# Patient Record
Sex: MALE | Race: WHITE | NOT HISPANIC OR LATINO
[De-identification: names, ages, dates, MRNs, and addresses within clinical notes are randomized per-mention and may not be internally consistent; named-entity substitution may affect disease eponyms.]

---
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
## 2018-05-10 ENCOUNTER — APPOINTMENT (OUTPATIENT)
Dept: PEDIATRIC GASTROENTEROLOGY | Facility: CLINIC | Age: 18
End: 2018-05-10
Payer: COMMERCIAL

## 2018-05-10 VITALS
HEART RATE: 55 BPM | SYSTOLIC BLOOD PRESSURE: 98 MMHG | DIASTOLIC BLOOD PRESSURE: 62 MMHG | HEIGHT: 70.51 IN | BODY MASS INDEX: 24.16 KG/M2 | WEIGHT: 170.64 LBS

## 2018-05-10 DIAGNOSIS — K62.5 HEMORRHAGE OF ANUS AND RECTUM: ICD-10-CM

## 2018-05-10 LAB
BASOPHILS # BLD AUTO: 0 K/UL
BASOPHILS NFR BLD AUTO: 0 %
EOSINOPHIL # BLD AUTO: 0.13 K/UL
EOSINOPHIL NFR BLD AUTO: 2.9 %
HCT VFR BLD CALC: 44.6 %
HGB BLD-MCNC: 14.9 G/DL
IMM GRANULOCYTES NFR BLD AUTO: 0.2 %
LYMPHOCYTES # BLD AUTO: 0.97 K/UL
LYMPHOCYTES NFR BLD AUTO: 21.8 %
MAN DIFF?: NORMAL
MCHC RBC-ENTMCNC: 30.6 PG
MCHC RBC-ENTMCNC: 33.4 GM/DL
MCV RBC AUTO: 91.6 FL
MONOCYTES # BLD AUTO: 0.37 K/UL
MONOCYTES NFR BLD AUTO: 8.3 %
NEUTROPHILS # BLD AUTO: 2.97 K/UL
NEUTROPHILS NFR BLD AUTO: 66.8 %
PLATELET # BLD AUTO: 131 K/UL
RBC # BLD: 4.87 M/UL
RBC # FLD: 13 %
WBC # FLD AUTO: 4.45 K/UL

## 2018-05-10 PROCEDURE — 99214 OFFICE O/P EST MOD 30 MIN: CPT

## 2018-05-11 LAB
ALBUMIN SERPL ELPH-MCNC: 5.1 G/DL
ALP BLD-CCNC: 86 U/L
ALT SERPL-CCNC: 11 U/L
ANION GAP SERPL CALC-SCNC: 13 MMOL/L
AST SERPL-CCNC: 19 U/L
BILIRUB SERPL-MCNC: 0.8 MG/DL
BUN SERPL-MCNC: 13 MG/DL
CALCIUM SERPL-MCNC: 9.8 MG/DL
CHLORIDE SERPL-SCNC: 105 MMOL/L
CO2 SERPL-SCNC: 25 MMOL/L
CREAT SERPL-MCNC: 1.08 MG/DL
CRP SERPL-MCNC: <0.2 MG/DL
GLUCOSE SERPL-MCNC: 78 MG/DL
POTASSIUM SERPL-SCNC: 4.9 MMOL/L
PROT SERPL-MCNC: 7.6 G/DL
SODIUM SERPL-SCNC: 143 MMOL/L

## 2018-05-15 LAB
ADALIMUMAB AB SERPL-MCNC: <25 NG/ML
ADALIMUMAB SERPL-MCNC: 25 UG/ML

## 2018-05-21 LAB — CALPROTECTIN FECAL: <16 UG/G

## 2018-07-24 PROBLEM — Z78.9 ALCOHOL USE: Status: ACTIVE | Noted: 2017-07-23

## 2018-12-07 ENCOUNTER — APPOINTMENT (OUTPATIENT)
Dept: PEDIATRIC GASTROENTEROLOGY | Facility: CLINIC | Age: 18
End: 2018-12-07
Payer: COMMERCIAL

## 2018-12-07 VITALS
BODY MASS INDEX: 24.29 KG/M2 | HEART RATE: 62 BPM | HEIGHT: 70.87 IN | DIASTOLIC BLOOD PRESSURE: 67 MMHG | WEIGHT: 173.5 LBS | SYSTOLIC BLOOD PRESSURE: 107 MMHG

## 2018-12-07 LAB
BASOPHILS # BLD AUTO: 0.02 K/UL
BASOPHILS NFR BLD AUTO: 0.3 %
EOSINOPHIL # BLD AUTO: 0.18 K/UL
EOSINOPHIL NFR BLD AUTO: 2.6 %
ERYTHROCYTE [SEDIMENTATION RATE] IN BLOOD BY WESTERGREN METHOD: 10 MM/HR
HCT VFR BLD CALC: 46.8 %
HGB BLD-MCNC: 15.2 G/DL
IMM GRANULOCYTES NFR BLD AUTO: 0.3 %
LYMPHOCYTES # BLD AUTO: 2.03 K/UL
LYMPHOCYTES NFR BLD AUTO: 29.6 %
MAN DIFF?: NORMAL
MCHC RBC-ENTMCNC: 30.3 PG
MCHC RBC-ENTMCNC: 32.5 GM/DL
MCV RBC AUTO: 93.4 FL
MONOCYTES # BLD AUTO: 0.6 K/UL
MONOCYTES NFR BLD AUTO: 8.8 %
NEUTROPHILS # BLD AUTO: 4 K/UL
NEUTROPHILS NFR BLD AUTO: 58.4 %
PLATELET # BLD AUTO: 153 K/UL
RBC # BLD: 5.01 M/UL
RBC # FLD: 13.5 %
WBC # FLD AUTO: 6.85 K/UL

## 2018-12-07 PROCEDURE — 99214 OFFICE O/P EST MOD 30 MIN: CPT

## 2018-12-10 LAB
ALBUMIN SERPL ELPH-MCNC: 4.8 G/DL
ALP BLD-CCNC: 72 U/L
ALT SERPL-CCNC: 14 U/L
ANION GAP SERPL CALC-SCNC: 12 MMOL/L
AST SERPL-CCNC: 22 U/L
BILIRUB SERPL-MCNC: 0.6 MG/DL
BUN SERPL-MCNC: 15 MG/DL
CALCIUM SERPL-MCNC: 10 MG/DL
CHLORIDE SERPL-SCNC: 103 MMOL/L
CO2 SERPL-SCNC: 24 MMOL/L
CREAT SERPL-MCNC: 0.92 MG/DL
CRP SERPL-MCNC: <0.1 MG/DL
GGT SERPL-CCNC: 15 U/L
GLUCOSE SERPL-MCNC: 84 MG/DL
POTASSIUM SERPL-SCNC: 4.6 MMOL/L
PROT SERPL-MCNC: 8.1 G/DL
SODIUM SERPL-SCNC: 139 MMOL/L

## 2018-12-11 NOTE — HISTORY OF PRESENT ILLNESS
[de-identified] : Brief Overview: Caleb presented in December 2014 with intermittent abdominal pain, loose stool, fever, and fecal urgency over a few months period, and a few days of rectal bleeding. He was found to have mild anemia, hypoalbuminemia, and elevated CRP.  Fecal calprotectin was 253 (nl < 120). He underwent endoscopy and colonoscopy with findings of aphthous ulcers in stomach, duodenum and colon including an ulcerated ileocecal valve. He also had a perianal fissure. Biopsies identified chronic gastritis and granuloma in the colon.  An MR Enterography showed diffuse thickening of the small bowel with diffuse enhancement in jejunum > ileum and mild hyperenhancement of the duodenum.  Between January - March 2015, Caleb completed 8 weeks of enteral nutritional therapy induction of remission regimen with 90% of calories from ensure and 10% from restricted list of foods. He then went on to continue a maintenance regimen of 50% calories from formula and 50% from a restricted list of foods known as Crohn disease elimination diet. This led to clinical remission and resolution of his anemia, hypoalbuminemia.  Fecal calprotectin at completion of the 8 week induction of remission trended down from 253 to 179. A video capsule endoscopy in 9/2015 demonstrated mild to moderate active disease with erythema and superficial aphthae throughout the small bowel, most prominent in the jejunum / mid small bowel. Following this study, initiation of immunomodulator or biologic was discussed, however Caleb and his parents preferred non-immune suppressive therapy, so he was started on metronidazole (rifaximin was denied by insurance) and continued dietary therapy. Ultimately, he abandoned the dietary therapy, and while on no therapy he continued to feel well.  A video capsule endoscopy was performed in May 2016, which demonstrated moderate jejunal disease with edema, erythema and possible luminal narrowing. He had scattered superficial aphthae in the mid and distal small bowel.  A fecal calprotectin in September 2016 was normal (<16). Caleb started Humira 9/15/16. Repeat MR Enterography in May 2017 with resolution of previous abnormal jejunal bowel loops. On regular blood tests, he was found to have mild thrombocytopenia. He was evaluated by Dr. 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 Caleb does not have any bleeding or bruising symptoms. \par \par Interval history: Caleb has been well since last visit. No symptoms of concern. He has regular adherence to his Humira, has been very good about taking every 14 days.  He had a series of injections with local red painful reaction at injection site, but this has resolved.  Last few injections no problems.  He has no abdominal pain, regular formed bowel movement without rectal bleeding.

## 2018-12-11 NOTE — ASSESSMENT
[Educated Patient & Family about Diagnosis] : educated the patient and family about the diagnosis [FreeTextEntry1] : In summary, Caleb is a 18 year old male with Crohn Disease predominantly of small bowel jejunum > ileum with clinical, biochemical, and radiographic (MR) remission of disease.  Discussed ultimately repeating a video capsule and colonoscopy to evaluate the mucosa.  Also discussed transition to adult GI in the next 12-18 months once he has plans for post-college graduation in place.  \par \par Recommended plan\par - labs today\par - continue Humira 40 mg every 14 days\par

## 2018-12-11 NOTE — PHYSICAL EXAM
[Well Nourished] : well nourished [NAD] : in no acute distress [icteric] : anicteric [Moist & Pink Mucous Membranes] : moist and pink mucous membranes [CTAB] : lungs clear to auscultation bilaterally [Respiratory Distress] : no respiratory distress  [Regular Rate and Rhythm] : regular rate and rhythm [Normal S1, S2] : normal S1 and S2 [Soft] : soft  [Distended] : non distended [Tender] : non tender [Normal Bowel Sounds] : normal bowel sounds [No HSM] : no hepatosplenomegaly appreciated [Normal Tone] : normal tone [Well-Perfused] : well-perfused [Edema] : no edema [Cyanosis] : no cyanosis [Rash] : no rash [Jaundice] : no jaundice [Interactive] : interactive

## 2018-12-11 NOTE — CONSULT LETTER
[Dear  ___] : Dear  [unfilled], [Courtesy Letter:] : I had the pleasure of seeing your patient, [unfilled], in my office today. [Please see my note below.] : Please see my note below. [Consult Closing:] : Thank you very much for allowing me to participate in the care of this patient.  If you have any questions, please do not hesitate to contact me. [Sincerely,] : Sincerely, [FreeTextEntry3] : Dev Bernal MD MS\par [facility]\par

## 2018-12-11 NOTE — REASON FOR VISIT
[Consultation Follow Up] : a consultation follow up  [Patient] : patient [Mother] : mother [FreeTextEntry2] : Crohn Disease

## 2019-04-30 ENCOUNTER — APPOINTMENT (OUTPATIENT)
Dept: PULMONOLOGY | Facility: CLINIC | Age: 19
End: 2019-04-30
Payer: COMMERCIAL

## 2019-04-30 VITALS
HEIGHT: 71 IN | DIASTOLIC BLOOD PRESSURE: 80 MMHG | SYSTOLIC BLOOD PRESSURE: 120 MMHG | HEART RATE: 66 BPM | OXYGEN SATURATION: 99 %

## 2019-04-30 DIAGNOSIS — J45.909 UNSPECIFIED ASTHMA, UNCOMPLICATED: ICD-10-CM

## 2019-04-30 DIAGNOSIS — R06.00 DYSPNEA, UNSPECIFIED: ICD-10-CM

## 2019-04-30 PROCEDURE — 94729 DIFFUSING CAPACITY: CPT

## 2019-04-30 PROCEDURE — 94727 GAS DIL/WSHOT DETER LNG VOL: CPT

## 2019-04-30 PROCEDURE — 99242 OFF/OP CONSLTJ NEW/EST SF 20: CPT | Mod: 25

## 2019-04-30 PROCEDURE — 94060 EVALUATION OF WHEEZING: CPT

## 2019-04-30 RX ORDER — ALBUTEROL SULFATE 90 UG/1
108 (90 BASE) AEROSOL, METERED RESPIRATORY (INHALATION)
Qty: 1 | Refills: 1 | Status: ACTIVE | COMMUNITY
Start: 2019-04-30 | End: 1900-01-01

## 2019-04-30 NOTE — PHYSICAL EXAM
[General Appearance - Well Developed] : well developed [Normal Appearance] : normal appearance [Well Groomed] : well groomed [General Appearance - Well Nourished] : well nourished [No Deformities] : no deformities [General Appearance - In No Acute Distress] : no acute distress [Neck Appearance] : the appearance of the neck was normal [Neck Cervical Mass (___cm)] : no neck mass was observed [Jugular Venous Distention Increased] : there was no jugular-venous distention [Thyroid Diffuse Enlargement] : the thyroid was not enlarged [Thyroid Nodule] : there were no palpable thyroid nodules [Heart Rate And Rhythm] : heart rate and rhythm were normal [Heart Sounds] : normal S1 and S2 [Murmurs] : no murmurs present [Respiration, Rhythm And Depth] : normal respiratory rhythm and effort [Exaggerated Use Of Accessory Muscles For Inspiration] : no accessory muscle use [Auscultation Breath Sounds / Voice Sounds] : lungs were clear to auscultation bilaterally [Abdomen Soft] : soft [Abdomen Tenderness] : non-tender [Abdomen Mass (___ Cm)] : no abdominal mass palpated [Nail Clubbing] : no clubbing of the fingernails [Cyanosis, Localized] : no localized cyanosis [Petechial Hemorrhages (___cm)] : no petechial hemorrhages [Skin Color & Pigmentation] : normal skin color and pigmentation [Skin Turgor] : normal skin turgor [] : no rash [No Focal Deficits] : no focal deficits [Oriented To Time, Place, And Person] : oriented to person, place, and time [Impaired Insight] : insight and judgment were intact [Affect] : the affect was normal

## 2019-05-05 PROBLEM — R06.00 DYSPNEA: Status: ACTIVE | Noted: 2019-05-05

## 2019-05-05 NOTE — REVIEW OF SYSTEMS
[As Noted in HPI] : as noted in HPI [Sputum] : sputum  [Dyspnea] : dyspnea [Negative] : Sleep Disorder [Cough] : no cough [Wheezing] : no wheezing

## 2019-05-05 NOTE — DISCUSSION/SUMMARY
[FreeTextEntry1] : 17 yo male with mild OAD. Albuterol MDI PRN recommended for now. Treatment adjustment will depend on symptomatic needs.

## 2019-05-05 NOTE — HISTORY OF PRESENT ILLNESS
[FreeTextEntry1] : 19 yo male presents for evaluation of SOB associated with PRN sputum production for a few months. The patient also complains of PRN chest discomfort without fever or hemoptysis. He was treated with nebulized meds as a child, last about 15 years ago. He exercises without difficulty.

## 2019-05-22 ENCOUNTER — APPOINTMENT (OUTPATIENT)
Dept: PEDIATRIC GASTROENTEROLOGY | Facility: CLINIC | Age: 19
End: 2019-05-22
Payer: COMMERCIAL

## 2019-05-22 VITALS
SYSTOLIC BLOOD PRESSURE: 138 MMHG | BODY MASS INDEX: 26.22 KG/M2 | HEIGHT: 70.47 IN | HEART RATE: 67 BPM | WEIGHT: 185.19 LBS | DIASTOLIC BLOOD PRESSURE: 74 MMHG

## 2019-05-22 PROCEDURE — 99214 OFFICE O/P EST MOD 30 MIN: CPT

## 2019-05-23 ENCOUNTER — MEDICATION RENEWAL (OUTPATIENT)
Age: 19
End: 2019-05-23

## 2019-05-27 NOTE — PHYSICAL EXAM
[Well Nourished] : well nourished [NAD] : in no acute distress [icteric] : anicteric [Moist & Pink Mucous Membranes] : moist and pink mucous membranes [CTAB] : lungs clear to auscultation bilaterally [Respiratory Distress] : no respiratory distress  [Regular Rate and Rhythm] : regular rate and rhythm [Normal S1, S2] : normal S1 and S2 [Soft] : soft  [Distended] : non distended [Tender] : non tender [Normal Bowel Sounds] : normal bowel sounds [Normal Tone] : normal tone [No HSM] : no hepatosplenomegaly appreciated [Well-Perfused] : well-perfused [Edema] : no edema [Cyanosis] : no cyanosis [Rash] : no rash [Jaundice] : no jaundice [Interactive] : interactive

## 2019-05-27 NOTE — ASSESSMENT
[Educated Patient & Family about Diagnosis] : educated the patient and family about the diagnosis [FreeTextEntry1] : In summary, Caleb is a 18 year old male with Crohn Disease in remission on Humira with therapeutic ADA levels and no symptoms.  Calprotectin normal May 2018, last small bowel imaging by MRI 2017.  \par \par Recommended plan\par - continue Humira at current dose\par - This summer video capsule to evaluate for mucosal healing\par - labs at time of VCE

## 2019-05-27 NOTE — HISTORY OF PRESENT ILLNESS
[de-identified] : Brief Overview: Caleb presented in December 2014 with intermittent abdominal pain, loose stool, fever, and fecal urgency over a few months period, and a few days of rectal bleeding. He was found to have mild anemia, hypoalbuminemia, and elevated CRP. Fecal calprotectin was 253 (nl < 120). He underwent endoscopy and colonoscopy with findings of aphthous ulcers in stomach, duodenum and colon including an ulcerated ileocecal valve. He also had a perianal fissure. Biopsies identified chronic gastritis and granuloma in the colon. An MR Enterography showed diffuse thickening of the small bowel with diffuse enhancement in jejunum > ileum and mild hyperenhancement of the duodenum. Between January - March 2015, Caleb completed 8 weeks of enteral nutritional therapy induction of remission regimen with 90% of calories from ensure and 10% from restricted list of foods. He then went on to continue a maintenance regimen of 50% calories from formula and 50% from a restricted list of foods known as Crohn disease elimination diet. This led to clinical remission and resolution of his anemia, hypoalbuminemia. Fecal calprotectin at completion of the 8 week induction of remission trended down from 253 to 179. A video capsule endoscopy in 9/2015 demonstrated mild to moderate active disease with erythema and superficial aphthae throughout the small bowel, most prominent in the jejunum / mid small bowel. Following this study, initiation of immunomodulator or biologic was discussed, however Caleb and his parents preferred non-immune suppressive therapy, so he was started on metronidazole (rifaximin was denied by insurance) and continued dietary therapy. Ultimately, he abandoned the dietary therapy, and while on no therapy he continued to feel well. A video capsule endoscopy was performed in May 2016, which demonstrated moderate jejunal disease with edema, erythema and possible luminal narrowing. He had scattered superficial aphthae in the mid and distal small bowel. A fecal calprotectin in September 2016 was normal (<16). Caleb started Humira 9/15/16. Repeat MR Enterography in May 2017 with resolution of previous abnormal jejunal bowel loops. On regular blood tests, he was found to have mild thrombocytopenia. He was evaluated by Dr. 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 Caleb does not have any bleeding or bruising symptoms. \par \par Interval history: Caleb has been well since last visit. No symptoms of concern. He has regular adherence to his Humira, has been very good about taking every 14 days. In the past, he had a series of injections with local red painful reaction at injection site, but this has resolved. Last few injections no problems. He has no abdominal pain, regular formed bowel movement without rectal bleeding.  He recently saw a pulmonologist for cough / sputum production, diagnosed with mild obstructive airway disease and prescribed a bronchodilator prn.

## 2019-05-27 NOTE — CONSULT LETTER
[Dear  ___] : Dear  [unfilled], [Please see my note below.] : Please see my note below. [Courtesy Letter:] : I had the pleasure of seeing your patient, [unfilled], in my office today. [Consult Closing:] : Thank you very much for allowing me to participate in the care of this patient.  If you have any questions, please do not hesitate to contact me. [Sincerely,] : Sincerely, [FreeTextEntry3] : Dev Bernal MD MS\par [facility]\par

## 2019-07-09 ENCOUNTER — OUTPATIENT (OUTPATIENT)
Dept: OUTPATIENT SERVICES | Age: 19
LOS: 1 days | End: 2019-07-09
Payer: COMMERCIAL

## 2019-07-09 DIAGNOSIS — K50.90 CROHN'S DISEASE, UNSPECIFIED, WITHOUT COMPLICATIONS: ICD-10-CM

## 2019-07-09 PROCEDURE — 91110 GI TRC IMG INTRAL ESOPH-ILE: CPT | Mod: 26

## 2019-07-23 ENCOUNTER — APPOINTMENT (OUTPATIENT)
Dept: PULMONOLOGY | Facility: CLINIC | Age: 19
End: 2019-07-23

## 2019-08-14 ENCOUNTER — LABORATORY RESULT (OUTPATIENT)
Age: 19
End: 2019-08-14

## 2019-08-14 LAB
25(OH)D3 SERPL-MCNC: 47.4 NG/ML
ALBUMIN SERPL ELPH-MCNC: 4.4 G/DL
ALP BLD-CCNC: 95 U/L
ALT SERPL-CCNC: 43 U/L
ANION GAP SERPL CALC-SCNC: 12 MMOL/L
AST SERPL-CCNC: 61 U/L
BILIRUB SERPL-MCNC: 0.6 MG/DL
BUN SERPL-MCNC: 16 MG/DL
CALCIUM SERPL-MCNC: 9.2 MG/DL
CHLORIDE SERPL-SCNC: 100 MMOL/L
CO2 SERPL-SCNC: 26 MMOL/L
CREAT SERPL-MCNC: 0.97 MG/DL
CRP SERPL-MCNC: 0.21 MG/DL
GLUCOSE SERPL-MCNC: 76 MG/DL
HBV SURFACE AG SER QL: NONREACTIVE
POTASSIUM SERPL-SCNC: 4.3 MMOL/L
PROT SERPL-MCNC: 7.4 G/DL
SODIUM SERPL-SCNC: 138 MMOL/L

## 2019-08-16 ENCOUNTER — TRANSCRIPTION ENCOUNTER (OUTPATIENT)
Age: 19
End: 2019-08-16

## 2019-08-16 LAB
BASOPHILS # BLD AUTO: 0 K/UL
BASOPHILS NFR BLD AUTO: 0 %
EOSINOPHIL # BLD AUTO: 0.1 K/UL
EOSINOPHIL NFR BLD AUTO: 0.9 %
HCT VFR BLD CALC: 48 %
HGB BLD-MCNC: 14.4 G/DL
LYMPHOCYTES # BLD AUTO: 3.32 K/UL
LYMPHOCYTES NFR BLD AUTO: 31 %
MAN DIFF?: YES
MCHC RBC-ENTMCNC: 29.9 PG
MCHC RBC-ENTMCNC: 30 GM/DL
MCV RBC AUTO: 99.6 FL
MONOCYTES # BLD AUTO: 0.76 K/UL
MONOCYTES NFR BLD AUTO: 7.1 %
NEUTROPHILS # BLD AUTO: 3.03 K/UL
NEUTROPHILS NFR BLD AUTO: 28.3 %
PLATELET # BLD AUTO: 107 K/UL
RBC # BLD: 4.82 M/UL
RBC # FLD: 13.8 %
WBC # FLD AUTO: 10.72 K/UL

## 2019-08-17 ENCOUNTER — CLINICAL ADVICE (OUTPATIENT)
Age: 19
End: 2019-08-17

## 2019-08-18 LAB
M TB IFN-G BLD-IMP: NEGATIVE
QUANTIFERON TB PLUS MITOGEN MINUS NIL: 9.06 IU/ML
QUANTIFERON TB PLUS NIL: 0.07 IU/ML
QUANTIFERON TB PLUS TB1 MINUS NIL: -0.01 IU/ML
QUANTIFERON TB PLUS TB2 MINUS NIL: -0.01 IU/ML

## 2019-08-21 LAB
EBV EA AB SER IA-ACNC: 77.1 U/ML
EBV EA AB TITR SER IF: POSITIVE
EBV EA IGG SER QL IA: 32.3 U/ML
EBV EA IGG SER-ACNC: POSITIVE
EBV EA IGM SER IA-ACNC: POSITIVE
EBV PATRN SPEC IB-IMP: NORMAL
EBV VCA IGG SER IA-ACNC: 78.7 U/ML
EBV VCA IGM SER QL IA: >160 U/ML
EPSTEIN-BARR VIRUS CAPSID ANTIGEN IGG: POSITIVE

## 2019-09-12 ENCOUNTER — MEDICATION RENEWAL (OUTPATIENT)
Age: 19
End: 2019-09-12

## 2019-09-18 LAB
ALBUMIN SERPL ELPH-MCNC: 4.7 G/DL
ALP BLD-CCNC: 90 U/L
ALT SERPL-CCNC: 18 U/L
AST SERPL-CCNC: 23 U/L
BILIRUB DIRECT SERPL-MCNC: 0.2 MG/DL
BILIRUB INDIRECT SERPL-MCNC: 0.5 MG/DL
BILIRUB SERPL-MCNC: 0.7 MG/DL
PROT SERPL-MCNC: 7.5 G/DL

## 2019-09-19 LAB
BASOPHILS # BLD AUTO: 0.04 K/UL
BASOPHILS NFR BLD AUTO: 0.6 %
EBV EA AB SER IA-ACNC: 48.1 U/ML
EBV EA AB TITR SER IF: NEGATIVE
EBV EA IGG SER QL IA: <3 U/ML
EBV EA IGG SER-ACNC: POSITIVE
EBV EA IGM SER IA-ACNC: POSITIVE
EBV PATRN SPEC IB-IMP: NORMAL
EBV VCA IGG SER IA-ACNC: 49.5 U/ML
EBV VCA IGM SER QL IA: >160 U/ML
EOSINOPHIL # BLD AUTO: 0.34 K/UL
EOSINOPHIL NFR BLD AUTO: 4.9 %
EPSTEIN-BARR VIRUS CAPSID ANTIGEN IGG: POSITIVE
HCT VFR BLD CALC: 46.2 %
HGB BLD-MCNC: 14.8 G/DL
IMM GRANULOCYTES NFR BLD AUTO: 0.1 %
LYMPHOCYTES # BLD AUTO: 2.56 K/UL
LYMPHOCYTES NFR BLD AUTO: 37.1 %
MAN DIFF?: NORMAL
MCHC RBC-ENTMCNC: 30.3 PG
MCHC RBC-ENTMCNC: 32 GM/DL
MCV RBC AUTO: 94.7 FL
MONOCYTES # BLD AUTO: 0.61 K/UL
MONOCYTES NFR BLD AUTO: 8.8 %
NEUTROPHILS # BLD AUTO: 3.34 K/UL
NEUTROPHILS NFR BLD AUTO: 48.5 %
PLATELET # BLD AUTO: 128 K/UL
RBC # BLD: 4.88 M/UL
RBC # FLD: 12.6 %
WBC # FLD AUTO: 6.9 K/UL

## 2019-09-23 LAB
EBV DNA SERPL NAA+PROBE-ACNC: NOT DETECTED IU/ML
EBVPCR LOG: NOT DETECTED LOGIU/ML

## 2019-10-11 ENCOUNTER — APPOINTMENT (OUTPATIENT)
Dept: PEDIATRIC GASTROENTEROLOGY | Facility: CLINIC | Age: 19
End: 2019-10-11
Payer: COMMERCIAL

## 2019-10-11 VITALS
HEART RATE: 59 BPM | BODY MASS INDEX: 25.43 KG/M2 | HEIGHT: 70.67 IN | SYSTOLIC BLOOD PRESSURE: 126 MMHG | WEIGHT: 181.66 LBS | DIASTOLIC BLOOD PRESSURE: 71 MMHG

## 2019-10-11 DIAGNOSIS — B27.90 INFECTIOUS MONONUCLEOSIS, UNSPECIFIED W/OUT COMPLICATION: ICD-10-CM

## 2019-10-11 PROCEDURE — 99214 OFFICE O/P EST MOD 30 MIN: CPT

## 2019-10-11 NOTE — ASSESSMENT
[Educated Patient & Family about Diagnosis] : educated the patient and family about the diagnosis [FreeTextEntry1] : Caleb is a 19 year old with Crohn Disease in remission on adalimumab who developed primary EBV infection 6+ weeks ago and has been off anti-TNF therapy since.  He appears to have full clinical resolution.  He has no hepatosplenomegaly.  Discussed with Fabriziomayra rare cases of malignancy including HSTCL secondary to EBV infection in immunosuppressed patients.  Will recheck liver enzymes and EBV serology today.  He can restart adalimumab.  Recommended a loading dose of 80 mg, then back to regular 40 mg dosing in 2 weeks and every other week thereafter.

## 2019-10-11 NOTE — HISTORY OF PRESENT ILLNESS
[de-identified] : Since last visit, Caleb developed primary EBV infection in August with predominant symptom of cervical lymphadenopathy.  Infection was confirmed by EBV serology.  He immediately stopped his adalimumab injections.  He has not had much fatigue, muscle aches or any other systemic complaint.  He has had repeat serology identifying EBV EA and not yet development of EBNA.  He had a EBV DNA PCR without any circulating virus.  He now feels well with resolution of lymphadenopathy.

## 2019-10-11 NOTE — CONSULT LETTER
[Dear  ___] : Dear  [unfilled], [Courtesy Letter:] : I had the pleasure of seeing your patient, [unfilled], in my office today. [Please see my note below.] : Please see my note below. [Referral Closing:] : Thank you very much for seeing this patient.  If you have any questions, please do not hesitate to contact me. [Sincerely,] : Sincerely, [FreeTextEntry3] : Dev Bernal MD MS\par [facility]\par

## 2019-10-11 NOTE — PHYSICAL EXAM
[Well Nourished] : well nourished [NAD] : in no acute distress [icteric] : anicteric [Moist & Pink Mucous Membranes] : moist and pink mucous membranes [CTAB] : lungs clear to auscultation bilaterally [Respiratory Distress] : no respiratory distress  [Regular Rate and Rhythm] : regular rate and rhythm [Normal S1, S2] : normal S1 and S2 [Soft] : soft  [Distended] : non distended [Tender] : non tender [Normal Bowel Sounds] : normal bowel sounds [No HSM] : no hepatosplenomegaly appreciated [Normal Tone] : normal tone [Well-Perfused] : well-perfused [Edema] : no edema [Cyanosis] : no cyanosis [Rash] : no rash [Jaundice] : no jaundice [Interactive] : interactive [de-identified] : No cervical, supraclavicular or axillary lymphadenopathy

## 2019-10-16 LAB
ALBUMIN SERPL ELPH-MCNC: 4.5 G/DL
ALP BLD-CCNC: 85 U/L
ALT SERPL-CCNC: 18 U/L
AST SERPL-CCNC: 23 U/L
BASOPHILS # BLD AUTO: 0.03 K/UL
BASOPHILS NFR BLD AUTO: 0.5 %
BILIRUB DIRECT SERPL-MCNC: 0.2 MG/DL
BILIRUB INDIRECT SERPL-MCNC: 0.5 MG/DL
BILIRUB SERPL-MCNC: 0.7 MG/DL
EOSINOPHIL # BLD AUTO: 0.26 K/UL
EOSINOPHIL NFR BLD AUTO: 4.2 %
HCT VFR BLD CALC: 45.8 %
HGB BLD-MCNC: 14.8 G/DL
IMM GRANULOCYTES NFR BLD AUTO: 0.5 %
LYMPHOCYTES # BLD AUTO: 2.03 K/UL
LYMPHOCYTES NFR BLD AUTO: 32.7 %
MAN DIFF?: NORMAL
MCHC RBC-ENTMCNC: 29.2 PG
MCHC RBC-ENTMCNC: 32.3 GM/DL
MCV RBC AUTO: 90.3 FL
MONOCYTES # BLD AUTO: 0.54 K/UL
MONOCYTES NFR BLD AUTO: 8.7 %
NEUTROPHILS # BLD AUTO: 3.31 K/UL
NEUTROPHILS NFR BLD AUTO: 53.4 %
PLATELET # BLD AUTO: 140 K/UL
PROT SERPL-MCNC: 7 G/DL
RBC # BLD: 5.07 M/UL
RBC # FLD: 12.7 %
WBC # FLD AUTO: 6.2 K/UL

## 2019-10-18 LAB
EBV EA AB SER IA-ACNC: 35.8 U/ML
EBV EA AB TITR SER IF: NEGATIVE
EBV EA IGG SER QL IA: <3 U/ML
EBV EA IGG SER-ACNC: POSITIVE
EBV EA IGM SER IA-ACNC: POSITIVE
EBV PATRN SPEC IB-IMP: NORMAL
EBV VCA IGG SER IA-ACNC: 57.8 U/ML
EBV VCA IGM SER QL IA: >160 U/ML
EPSTEIN-BARR VIRUS CAPSID ANTIGEN IGG: POSITIVE

## 2020-04-03 ENCOUNTER — RX RENEWAL (OUTPATIENT)
Age: 20
End: 2020-04-03

## 2020-09-10 ENCOUNTER — APPOINTMENT (OUTPATIENT)
Dept: PEDIATRIC GASTROENTEROLOGY | Facility: CLINIC | Age: 20
End: 2020-09-10

## 2020-11-12 ENCOUNTER — RX RENEWAL (OUTPATIENT)
Age: 20
End: 2020-11-12

## 2020-11-17 ENCOUNTER — APPOINTMENT (OUTPATIENT)
Dept: PEDIATRIC GASTROENTEROLOGY | Facility: CLINIC | Age: 20
End: 2020-11-17
Payer: COMMERCIAL

## 2020-11-17 VITALS
TEMPERATURE: 98.4 F | WEIGHT: 186.51 LBS | HEART RATE: 55 BPM | BODY MASS INDEX: 26.11 KG/M2 | DIASTOLIC BLOOD PRESSURE: 76 MMHG | SYSTOLIC BLOOD PRESSURE: 126 MMHG | HEIGHT: 70.71 IN

## 2020-11-17 DIAGNOSIS — Z51.81 ENCOUNTER FOR THERAPEUTIC DRUG LVL MONITORING: ICD-10-CM

## 2020-11-17 DIAGNOSIS — Z79.899 OTHER LONG TERM (CURRENT) DRUG THERAPY: ICD-10-CM

## 2020-11-17 LAB
25(OH)D3 SERPL-MCNC: 32.7 NG/ML
ALBUMIN SERPL ELPH-MCNC: 4.7 G/DL
ALP BLD-CCNC: 77 U/L
ALT SERPL-CCNC: 12 U/L
ANION GAP SERPL CALC-SCNC: 11 MMOL/L
AST SERPL-CCNC: 18 U/L
BASOPHILS # BLD AUTO: 0.03 K/UL
BASOPHILS NFR BLD AUTO: 0.4 %
BILIRUB SERPL-MCNC: 0.7 MG/DL
BUN SERPL-MCNC: 15 MG/DL
CALCIUM SERPL-MCNC: 10.1 MG/DL
CHLORIDE SERPL-SCNC: 102 MMOL/L
CO2 SERPL-SCNC: 26 MMOL/L
CREAT SERPL-MCNC: 0.83 MG/DL
CRP SERPL-MCNC: <0.1 MG/DL
EOSINOPHIL # BLD AUTO: 0.3 K/UL
EOSINOPHIL NFR BLD AUTO: 4.1 %
GGT SERPL-CCNC: 14 U/L
GLUCOSE SERPL-MCNC: 89 MG/DL
HBV SURFACE AG SER QL: NONREACTIVE
HCT VFR BLD CALC: 46.3 %
HGB BLD-MCNC: 15.2 G/DL
IMM GRANULOCYTES NFR BLD AUTO: 0.4 %
IRON SATN MFR SERPL: 42 %
IRON SERPL-MCNC: 143 UG/DL
LYMPHOCYTES # BLD AUTO: 2.55 K/UL
LYMPHOCYTES NFR BLD AUTO: 34.7 %
MAN DIFF?: NORMAL
MCHC RBC-ENTMCNC: 30.3 PG
MCHC RBC-ENTMCNC: 32.8 GM/DL
MCV RBC AUTO: 92.2 FL
MONOCYTES # BLD AUTO: 0.56 K/UL
MONOCYTES NFR BLD AUTO: 7.6 %
NEUTROPHILS # BLD AUTO: 3.88 K/UL
NEUTROPHILS NFR BLD AUTO: 52.8 %
PLATELET # BLD AUTO: 130 K/UL
POTASSIUM SERPL-SCNC: 4.4 MMOL/L
PROT SERPL-MCNC: 7.4 G/DL
RBC # BLD: 5.02 M/UL
RBC # FLD: 12.2 %
SODIUM SERPL-SCNC: 139 MMOL/L
TIBC SERPL-MCNC: 338 UG/DL
UIBC SERPL-MCNC: 195 UG/DL
WBC # FLD AUTO: 7.35 K/UL

## 2020-11-17 PROCEDURE — 99072 ADDL SUPL MATRL&STAF TM PHE: CPT

## 2020-11-17 PROCEDURE — 99214 OFFICE O/P EST MOD 30 MIN: CPT

## 2020-11-17 NOTE — HISTORY OF PRESENT ILLNESS
[de-identified] : Brief Overview: Caleb presented in December 2014 with intermittent abdominal pain, loose stool, fever, and fecal urgency over a few months period, and a few days of rectal bleeding. He was found to have mild anemia, hypoalbuminemia, and elevated CRP. Fecal calprotectin was 253 (nl < 120). He underwent endoscopy and colonoscopy with findings of aphthous ulcers in stomach, duodenum and colon including an ulcerated ileocecal valve. He also had a perianal fissure. Biopsies identified chronic gastritis and granuloma in the colon. An MR Enterography showed diffuse thickening of the small bowel with diffuse enhancement in jejunum > ileum and mild hyperenhancement of the duodenum. Between January - March 2015, Caleb completed 8 weeks of enteral nutritional therapy induction of remission regimen with 90% of calories from ensure and 10% from restricted list of foods. He then went on to continue a maintenance regimen of 50% calories from formula and 50% from a restricted list of foods known as Crohn Disease Elimination Diet. This led to clinical remission and resolution of his anemia, hypoalbuminemia. Fecal calprotectin at completion of the 8 week induction of remission trended down from 253 to 179. A video capsule endoscopy in 9/2015 demonstrated mild to moderate active disease with erythema and superficial aphthae throughout the small bowel, most prominent in the jejunum / mid small bowel. Following this study, initiation of immunomodulator or biologic was discussed, however Caleb and his parents preferred non-immune suppressive therapy, so he was started on metronidazole (rifaximin was denied by insurance) and continued dietary therapy. Ultimately, he abandoned the dietary therapy, and while on no therapy he continued to feel well. A video capsule endoscopy was performed in May 2016, which demonstrated moderate jejunal disease with edema, erythema and possible luminal narrowing. He had scattered superficial aphthae in the mid and distal small bowel. A fecal calprotectin in September 2016 was normal (<16). Caleb started Humira 9/15/16. Repeat MR Enterography in May 2017 with resolution of previous abnormal jejunal bowel loops. On regular blood tests, he was found to have mild thrombocytopenia. He was evaluated by Dr. 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 Caleb does not have any bleeding or bruising symptoms.  Caleb has been able to achieve long term clinical remission on Adalimumab monotherapy.  He had a follow up video capsule endoscopy in July 2019 with a few scattered erosions in the small bowel with otherwise normal mucosa.  He has not had adverse events related to his IBD or Adalimumab.  He had EBV infection in August 2019 and temporarily was off his medication, otherwise has had consistent medication use.   \par \par Interval history: Caleb has been well since last visit. No symptoms of concern. He has regular adherence to his Humira, has been very good about taking every 14 days.  He has no abdominal pain, has regular formed bowel movement without rectal bleeding.  He has graduated college from [employer] with a degree in business.  He is in process of transition of his medical providers to adult medicine care.   \par

## 2020-11-17 NOTE — ASSESSMENT
[Educated Patient & Family about Diagnosis] : educated the patient and family about the diagnosis [FreeTextEntry1] : Caleb is a 20 year old male with predominantly small bowel Crohn Disease in sustained clinical remission on Adalimumab 40 mg every other week monotherapy.  He has had majority of his mucosal disease healed as seen on video capsule endoscopy.  His overall course has been uncomplicated.  Today I reviewed with Caleb his diagnosis, medication, and made recommendations for transition of care to an adult GI medicine provider.  We discussed issues around COVID-19 and IBD and what is known in the literature to date.  Will obtain updated labs today including a ADA level for therapeutic drug monitoring.  Antonino will call me with any concerns.

## 2020-11-17 NOTE — CONSULT LETTER
[Dear  ___] : Dear  [unfilled], [Courtesy Letter:] : I had the pleasure of seeing your patient, [unfilled], in my office today. [Please see my note below.] : Please see my note below. [Consult Closing:] : Thank you very much for allowing me to participate in the care of this patient.  If you have any questions, please do not hesitate to contact me. [Sincerely,] : Sincerely, [FreeTextEntry3] : Dev Bernal MD MS\par [facility]\par

## 2020-11-18 LAB
EBV EA AB SER IA-ACNC: 38 U/ML
EBV EA AB TITR SER IF: POSITIVE
EBV EA IGG SER QL IA: 507 U/ML
EBV EA IGG SER-ACNC: POSITIVE
EBV EA IGM SER IA-ACNC: POSITIVE
EBV PATRN SPEC IB-IMP: NORMAL
EBV VCA IGG SER IA-ACNC: 354 U/ML
EBV VCA IGM SER QL IA: 146 U/ML
EPSTEIN-BARR VIRUS CAPSID ANTIGEN IGG: POSITIVE

## 2020-11-19 LAB
M TB IFN-G BLD-IMP: NEGATIVE
QUANTIFERON TB PLUS MITOGEN MINUS NIL: 6.57 IU/ML
QUANTIFERON TB PLUS NIL: 0.01 IU/ML
QUANTIFERON TB PLUS TB1 MINUS NIL: 0 IU/ML
QUANTIFERON TB PLUS TB2 MINUS NIL: 0.01 IU/ML

## 2020-11-27 LAB
ADALIMUMAB AB SERPL-MCNC: <25 NG/ML
ADALIMUMAB SERPL-MCNC: 9.2 UG/ML

## 2021-05-14 ENCOUNTER — OUTPATIENT (OUTPATIENT)
Dept: OUTPATIENT SERVICES | Facility: HOSPITAL | Age: 21
LOS: 1 days | Discharge: HOME | End: 2021-05-14

## 2021-05-14 DIAGNOSIS — D69.6 THROMBOCYTOPENIA, UNSPECIFIED: ICD-10-CM

## 2021-05-17 ENCOUNTER — RESULT REVIEW (OUTPATIENT)
Age: 21
End: 2021-05-17

## 2021-05-17 ENCOUNTER — APPOINTMENT (OUTPATIENT)
Dept: HEMATOLOGY ONCOLOGY | Facility: CLINIC | Age: 21
End: 2021-05-17
Payer: COMMERCIAL

## 2021-05-17 ENCOUNTER — OUTPATIENT (OUTPATIENT)
Dept: OUTPATIENT SERVICES | Facility: HOSPITAL | Age: 21
LOS: 1 days | Discharge: HOME | End: 2021-05-17

## 2021-05-17 VITALS
TEMPERATURE: 98 F | DIASTOLIC BLOOD PRESSURE: 80 MMHG | HEART RATE: 76 BPM | WEIGHT: 191.8 LBS | RESPIRATION RATE: 14 BRPM | OXYGEN SATURATION: 99 % | SYSTOLIC BLOOD PRESSURE: 139 MMHG

## 2021-05-17 DIAGNOSIS — D69.6 THROMBOCYTOPENIA, UNSPECIFIED: ICD-10-CM

## 2021-05-17 LAB
BASOPHILS # BLD AUTO: 0.04 K/UL — SIGNIFICANT CHANGE UP (ref 0–0.2)
BASOPHILS NFR BLD AUTO: 0.6 % — SIGNIFICANT CHANGE UP (ref 0–2)
EOSINOPHIL # BLD AUTO: 0.27 K/UL — SIGNIFICANT CHANGE UP (ref 0–0.5)
EOSINOPHIL NFR BLD AUTO: 4.3 % — SIGNIFICANT CHANGE UP (ref 0–6)
HCT VFR BLD CALC: 43.7 % — SIGNIFICANT CHANGE UP (ref 39–50)
HGB BLD-MCNC: 15.2 G/DL — SIGNIFICANT CHANGE UP (ref 13–17)
IMM GRANULOCYTES NFR BLD AUTO: 0.3 % — SIGNIFICANT CHANGE UP (ref 0–1.5)
LYMPHOCYTES # BLD AUTO: 1.72 K/UL — SIGNIFICANT CHANGE UP (ref 1–3.3)
LYMPHOCYTES # BLD AUTO: 27.3 % — SIGNIFICANT CHANGE UP (ref 13–44)
MCHC RBC-ENTMCNC: 31 PG — SIGNIFICANT CHANGE UP (ref 27–34)
MCHC RBC-ENTMCNC: 34.8 G/DL — SIGNIFICANT CHANGE UP (ref 32–36)
MCV RBC AUTO: 89.2 FL — SIGNIFICANT CHANGE UP (ref 80–100)
MONOCYTES # BLD AUTO: 0.4 K/UL — SIGNIFICANT CHANGE UP (ref 0–0.9)
MONOCYTES NFR BLD AUTO: 6.3 % — SIGNIFICANT CHANGE UP (ref 2–14)
NEUTROPHILS # BLD AUTO: 3.85 K/UL — SIGNIFICANT CHANGE UP (ref 1.8–7.4)
NEUTROPHILS NFR BLD AUTO: 61.2 % — SIGNIFICANT CHANGE UP (ref 43–77)
NRBC # BLD: 0 /100 WBCS — SIGNIFICANT CHANGE UP (ref 0–0)
PLATELET # BLD AUTO: 135 K/UL — LOW (ref 150–400)
RBC # BLD: 4.9 M/UL — SIGNIFICANT CHANGE UP (ref 4.2–5.8)
RBC # FLD: 11.9 % — SIGNIFICANT CHANGE UP (ref 10.3–14.5)
WBC # BLD: 6.3 K/UL — SIGNIFICANT CHANGE UP (ref 3.8–10.5)
WBC # FLD AUTO: 6.3 K/UL — SIGNIFICANT CHANGE UP (ref 3.8–10.5)

## 2021-05-17 PROCEDURE — 99072 ADDL SUPL MATRL&STAF TM PHE: CPT

## 2021-05-17 PROCEDURE — 99213 OFFICE O/P EST LOW 20 MIN: CPT

## 2021-05-17 RX ORDER — MONTELUKAST 10 MG/1
10 TABLET, FILM COATED ORAL
Qty: 30 | Refills: 0 | Status: ACTIVE | COMMUNITY
Start: 2020-11-27

## 2021-05-17 NOTE — HISTORY OF PRESENT ILLNESS
[de-identified] : 17 M diagnosed with Crohn's disease in Dec 2014. At that time platelet count was noted to be 367. In March 2015, platelets decreased to 168. Patient tried diet modification (nutritional therapy) for 1 year however this was not successful in controlling his symptoms. During 2015, his platelet count was noted to fluctuate between 135 - 160. Was not noted to have any significant cytopenias during this time. He started Humera in Sept 2016 and in Oct 2016, plts noted to be 133, then 123 in Feb 2017. By JUne 2017 they increased to 167. Pt's GI symptoms improved significantly during this time. \alex Ellis denies any history of abnormal bleeding aside from rectal bleeding associated with his Crohn's disease. There is no family history of hematologic conditions. Antonino has never experienced any clotting event.  [de-identified] : Returns for a f/u today. has not been seen in 3 yrs. Has questions regarding getting the vaccine in the setting of thrombocytopenia. \par Crohn's well controlled. Still on Humara. No c/o today.

## 2021-05-28 ENCOUNTER — RX RENEWAL (OUTPATIENT)
Age: 21
End: 2021-05-28

## 2021-06-25 ENCOUNTER — TRANSCRIPTION ENCOUNTER (OUTPATIENT)
Age: 21
End: 2021-06-25

## 2021-08-17 ENCOUNTER — APPOINTMENT (OUTPATIENT)
Dept: GASTROENTEROLOGY | Facility: CLINIC | Age: 21
End: 2021-08-17
Payer: COMMERCIAL

## 2021-08-17 VITALS
WEIGHT: 180 LBS | SYSTOLIC BLOOD PRESSURE: 115 MMHG | HEIGHT: 70 IN | BODY MASS INDEX: 25.77 KG/M2 | DIASTOLIC BLOOD PRESSURE: 75 MMHG | TEMPERATURE: 98 F | HEART RATE: 88 BPM | RESPIRATION RATE: 14 BRPM | OXYGEN SATURATION: 98 %

## 2021-08-17 PROCEDURE — 99204 OFFICE O/P NEW MOD 45 MIN: CPT

## 2021-08-17 NOTE — PHYSICAL EXAM
[General Appearance - Alert] : alert [General Appearance - In No Acute Distress] : in no acute distress [Sclera] : the sclera and conjunctiva were normal [PERRL With Normal Accommodation] : pupils were equal in size, round, and reactive to light [Extraocular Movements] : extraocular movements were intact [Outer Ear] : the ears and nose were normal in appearance [Oropharynx] : the oropharynx was normal [Neck Appearance] : the appearance of the neck was normal [Neck Cervical Mass (___cm)] : no neck mass was observed [Jugular Venous Distention Increased] : there was no jugular-venous distention [Thyroid Nodule] : there were no palpable thyroid nodules [Thyroid Diffuse Enlargement] : the thyroid was not enlarged [Auscultation Breath Sounds / Voice Sounds] : lungs were clear to auscultation bilaterally [Heart Rate And Rhythm] : heart rate was normal and rhythm regular [Heart Sounds] : normal S1 and S2 [Heart Sounds Gallop] : no gallops [Murmurs] : no murmurs [Heart Sounds Pericardial Friction Rub] : no pericardial rub [Edema] : there was no peripheral edema [Bowel Sounds] : normal bowel sounds [Abdomen Soft] : soft [Abdomen Tenderness] : non-tender [Abdomen Mass (___ Cm)] : no abdominal mass palpated [Cervical Lymph Nodes Enlarged Posterior Bilaterally] : posterior cervical [Cervical Lymph Nodes Enlarged Anterior Bilaterally] : anterior cervical [Supraclavicular Lymph Nodes Enlarged Bilaterally] : supraclavicular [Axillary Lymph Nodes Enlarged Bilaterally] : axillary [Femoral Lymph Nodes Enlarged Bilaterally] : femoral [Inguinal Lymph Nodes Enlarged Bilaterally] : inguinal [No CVA Tenderness] : no ~M costovertebral angle tenderness [No Spinal Tenderness] : no spinal tenderness [Abnormal Walk] : normal gait [Nail Clubbing] : no clubbing  or cyanosis of the fingernails [Musculoskeletal - Swelling] : no joint swelling seen [Motor Tone] : muscle strength and tone were normal [Skin Color & Pigmentation] : normal skin color and pigmentation [Skin Turgor] : normal skin turgor [] : no rash [Oriented To Time, Place, And Person] : oriented to person, place, and time [Impaired Insight] : insight and judgment were intact [Affect] : the affect was normal

## 2021-08-17 NOTE — HISTORY OF PRESENT ILLNESS
[de-identified] : 21-year-old male, 7-year history of Crohn's disease small bowel and colon\par Initial presentation with abdominal pain, weight loss\par No history of surgery\par No history of extraintestinal manifestations\par History of anal fissure but no fistula\par \par Patient had a initial treatment with enteral feeds, p.o.\par Response but no remission\par Eventually transition to Humira which she has been on for approximately 5 years\par Doing extremely well, injections every 14 days\par Does not notice any diminished affect between injections\par \par Last capsule endoscopy 2019 without any disease activity\par Soon will be due for surveillance colonoscopy\par \par Denies any family history of inflammatory bowel disease\par \par Social history: College graduate, working on a fishing boat\par 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

## 2021-08-17 NOTE — ASSESSMENT
[FreeTextEntry1] : Well-controlled Crohn's disease, Humira maintenance\par \par Plan\par Continue Humira,\par Blood testing and stool test as ordered\par Office visit again in 6 months, sooner as clinically indicated\par  will discuss surveillance colonoscopy at next office visit\par \par Patient referred by Dr. Dev Bernal

## 2021-08-17 NOTE — REASON FOR VISIT
[Consultation] : a consultation visit [FreeTextEntry1] : Crohn's disease, transitioning from pediatrics

## 2021-08-18 LAB
24R-OH-CALCIDIOL SERPL-MCNC: 77.2 PG/ML
ALBUMIN SERPL ELPH-MCNC: 5 G/DL
ALP BLD-CCNC: 70 U/L
ALT SERPL-CCNC: 9 U/L
ANION GAP SERPL CALC-SCNC: 16 MMOL/L
AST SERPL-CCNC: 18 U/L
BASOPHILS # BLD AUTO: 0.03 K/UL
BASOPHILS NFR BLD AUTO: 0.4 %
BILIRUB SERPL-MCNC: 0.3 MG/DL
BUN SERPL-MCNC: 14 MG/DL
CALCIUM SERPL-MCNC: 9.7 MG/DL
CHLORIDE SERPL-SCNC: 103 MMOL/L
CO2 SERPL-SCNC: 20 MMOL/L
CREAT SERPL-MCNC: 0.84 MG/DL
CRP SERPL-MCNC: <3 MG/L
EOSINOPHIL # BLD AUTO: 0.23 K/UL
EOSINOPHIL NFR BLD AUTO: 3.1 %
GLUCOSE SERPL-MCNC: 111 MG/DL
HCT VFR BLD CALC: 45.6 %
HGB BLD-MCNC: 15.1 G/DL
IMM GRANULOCYTES NFR BLD AUTO: 0.3 %
LYMPHOCYTES # BLD AUTO: 1.84 K/UL
LYMPHOCYTES NFR BLD AUTO: 24.8 %
MAN DIFF?: NORMAL
MCHC RBC-ENTMCNC: 30.4 PG
MCHC RBC-ENTMCNC: 33.1 GM/DL
MCV RBC AUTO: 91.9 FL
MONOCYTES # BLD AUTO: 0.55 K/UL
MONOCYTES NFR BLD AUTO: 7.4 %
NEUTROPHILS # BLD AUTO: 4.76 K/UL
NEUTROPHILS NFR BLD AUTO: 64 %
PLATELET # BLD AUTO: 144 K/UL
POTASSIUM SERPL-SCNC: 4.3 MMOL/L
PROT SERPL-MCNC: 7.7 G/DL
RBC # BLD: 4.96 M/UL
RBC # FLD: 12.7 %
SODIUM SERPL-SCNC: 139 MMOL/L
VIT B12 SERPL-MCNC: 272 PG/ML
WBC # FLD AUTO: 7.43 K/UL

## 2021-09-15 ENCOUNTER — NON-APPOINTMENT (OUTPATIENT)
Age: 21
End: 2021-09-15

## 2021-09-15 LAB — CALPROTECTIN FECAL: 87 UG/G

## 2021-11-16 ENCOUNTER — NON-APPOINTMENT (OUTPATIENT)
Age: 21
End: 2021-11-16

## 2021-12-30 ENCOUNTER — NON-APPOINTMENT (OUTPATIENT)
Age: 21
End: 2021-12-30

## 2022-02-08 ENCOUNTER — APPOINTMENT (OUTPATIENT)
Dept: GASTROENTEROLOGY | Facility: CLINIC | Age: 22
End: 2022-02-08
Payer: COMMERCIAL

## 2022-02-08 VITALS
DIASTOLIC BLOOD PRESSURE: 77 MMHG | HEIGHT: 71 IN | SYSTOLIC BLOOD PRESSURE: 141 MMHG | WEIGHT: 180 LBS | OXYGEN SATURATION: 98 % | HEART RATE: 96 BPM | BODY MASS INDEX: 25.2 KG/M2

## 2022-02-08 DIAGNOSIS — U07.1 COVID-19: ICD-10-CM

## 2022-02-08 PROCEDURE — 99214 OFFICE O/P EST MOD 30 MIN: CPT

## 2022-02-08 NOTE — ASSESSMENT
[FreeTextEntry1] : Crohn's colitis due for surveillance\par Doing well on Humira every other week, due for blood work\par \par \par Plan\par Indications for surveillance colonoscopy reviewed\par Risk benefits and alternatives discussed\par Patient agreeable to examination\par Blood testing as ordered\par Continue Humira

## 2022-02-08 NOTE — HISTORY OF PRESENT ILLNESS
[de-identified] : 21-year-old male history of Crohn's disease, small bowel and colon\par No history of surgery,\par History of anal fissure but no fistula\par \par Patient's initial treatment as pediatric patient with p.o., enteral feeds\par Response but no remission\par Subsequently switched to Humira which she has been on for 5 to 6 years\par Doing extremely well with injections every 14 days\par \par Due for surveillance colonoscopy\par \par Denies any abdominal pain, diarrhea, bleeding or weight loss\par \par Social history: Self-employed, house painting, carpentry

## 2022-02-10 LAB
ALBUMIN SERPL ELPH-MCNC: 4.8 G/DL
ALP BLD-CCNC: 72 U/L
ALT SERPL-CCNC: 16 U/L
ANION GAP SERPL CALC-SCNC: 12 MMOL/L
AST SERPL-CCNC: 18 U/L
BASOPHILS # BLD AUTO: 0.02 K/UL
BASOPHILS NFR BLD AUTO: 0.3 %
BILIRUB SERPL-MCNC: 0.8 MG/DL
BUN SERPL-MCNC: 14 MG/DL
CALCIUM SERPL-MCNC: 9.8 MG/DL
CHLORIDE SERPL-SCNC: 106 MMOL/L
CO2 SERPL-SCNC: 24 MMOL/L
CREAT SERPL-MCNC: 0.9 MG/DL
CRP SERPL-MCNC: <3 MG/L
EOSINOPHIL # BLD AUTO: 0.18 K/UL
EOSINOPHIL NFR BLD AUTO: 2.8 %
GLUCOSE SERPL-MCNC: 90 MG/DL
HCT VFR BLD CALC: 45.5 %
HGB BLD-MCNC: 14.5 G/DL
IMM GRANULOCYTES NFR BLD AUTO: 0.3 %
LYMPHOCYTES # BLD AUTO: 2.32 K/UL
LYMPHOCYTES NFR BLD AUTO: 36 %
MAN DIFF?: NORMAL
MCHC RBC-ENTMCNC: 30.3 PG
MCHC RBC-ENTMCNC: 31.9 GM/DL
MCV RBC AUTO: 95.2 FL
MONOCYTES # BLD AUTO: 0.52 K/UL
MONOCYTES NFR BLD AUTO: 8.1 %
NEUTROPHILS # BLD AUTO: 3.38 K/UL
NEUTROPHILS NFR BLD AUTO: 52.5 %
PLATELET # BLD AUTO: 135 K/UL
POTASSIUM SERPL-SCNC: 4.8 MMOL/L
PROT SERPL-MCNC: 7.1 G/DL
RBC # BLD: 4.78 M/UL
RBC # FLD: 12.5 %
SODIUM SERPL-SCNC: 142 MMOL/L
WBC # FLD AUTO: 6.44 K/UL

## 2022-02-14 LAB
M TB IFN-G BLD-IMP: NEGATIVE
QUANTIFERON TB PLUS MITOGEN MINUS NIL: 10 IU/ML
QUANTIFERON TB PLUS NIL: 0 IU/ML
QUANTIFERON TB PLUS TB1 MINUS NIL: 0 IU/ML
QUANTIFERON TB PLUS TB2 MINUS NIL: 0 IU/ML

## 2022-03-28 ENCOUNTER — NON-APPOINTMENT (OUTPATIENT)
Age: 22
End: 2022-03-28

## 2022-05-03 RX ORDER — SODIUM SULFATE, POTASSIUM SULFATE, MAGNESIUM SULFATE 17.5; 3.13; 1.6 G/ML; G/ML; G/ML
17.5-3.13-1.6 SOLUTION, CONCENTRATE ORAL
Qty: 1 | Refills: 0 | Status: ACTIVE | COMMUNITY
Start: 2022-02-08 | End: 1900-01-01

## 2022-05-04 ENCOUNTER — APPOINTMENT (OUTPATIENT)
Dept: GASTROENTEROLOGY | Facility: AMBULATORY MEDICAL SERVICES | Age: 22
End: 2022-05-04
Payer: COMMERCIAL

## 2022-05-04 PROCEDURE — 45380 COLONOSCOPY AND BIOPSY: CPT

## 2022-05-12 ENCOUNTER — NON-APPOINTMENT (OUTPATIENT)
Age: 22
End: 2022-05-12

## 2022-08-16 ENCOUNTER — RX RENEWAL (OUTPATIENT)
Age: 22
End: 2022-08-16

## 2022-11-08 ENCOUNTER — APPOINTMENT (OUTPATIENT)
Dept: GASTROENTEROLOGY | Facility: CLINIC | Age: 22
End: 2022-11-08

## 2022-11-08 VITALS
SYSTOLIC BLOOD PRESSURE: 120 MMHG | TEMPERATURE: 98.7 F | HEART RATE: 98 BPM | HEIGHT: 71 IN | BODY MASS INDEX: 25.62 KG/M2 | OXYGEN SATURATION: 98 % | WEIGHT: 183 LBS | DIASTOLIC BLOOD PRESSURE: 80 MMHG

## 2022-11-08 PROCEDURE — 99213 OFFICE O/P EST LOW 20 MIN: CPT

## 2022-11-08 NOTE — ASSESSMENT
[FreeTextEntry1] : Crohn's disease, clinically in remission\par Low vitamin B12\par \par Plan\par Continue Humira\par Begin B12 supplement\par Update blood work today\par Office visit again in 6 months

## 2022-11-08 NOTE — HISTORY OF PRESENT ILLNESS
[FreeTextEntry1] : 22-year-old male\par Small bowel Crohn's\par Continues to feel well on Humira maintenance\par Colonoscopy unremarkable May 2022\par Prior disease activity further up in the small intestine, detected by capsule\par Patient with low vitamin B12, has yet to begin supplement\par Weight stable without abdominal pain

## 2022-11-14 ENCOUNTER — NON-APPOINTMENT (OUTPATIENT)
Age: 22
End: 2022-11-14

## 2022-11-14 LAB
25(OH)D3 SERPL-MCNC: 35.5 NG/ML
ALBUMIN SERPL ELPH-MCNC: 4.5 G/DL
ALP BLD-CCNC: 63 U/L
ALT SERPL-CCNC: 26 U/L
ANION GAP SERPL CALC-SCNC: 9 MMOL/L
AST SERPL-CCNC: 99 U/L
BASOPHILS # BLD AUTO: 0.03 K/UL
BASOPHILS NFR BLD AUTO: 0.4 %
BILIRUB SERPL-MCNC: 0.3 MG/DL
BUN SERPL-MCNC: 11 MG/DL
CALCIUM SERPL-MCNC: 9.4 MG/DL
CHLORIDE SERPL-SCNC: 105 MMOL/L
CO2 SERPL-SCNC: 27 MMOL/L
CREAT SERPL-MCNC: 0.83 MG/DL
EGFR: 125 ML/MIN/1.73M2
EOSINOPHIL # BLD AUTO: 0.25 K/UL
EOSINOPHIL NFR BLD AUTO: 3.5 %
HBV CORE IGG+IGM SER QL: NONREACTIVE
HBV SURFACE AB SER QL: REACTIVE
HBV SURFACE AG SER QL: NONREACTIVE
HCT VFR BLD CALC: 45.6 %
HGB BLD-MCNC: 15.1 G/DL
IMM GRANULOCYTES NFR BLD AUTO: 0.3 %
LYMPHOCYTES # BLD AUTO: 2.83 K/UL
LYMPHOCYTES NFR BLD AUTO: 39.9 %
MAN DIFF?: NORMAL
MCHC RBC-ENTMCNC: 31.3 PG
MCHC RBC-ENTMCNC: 33.1 GM/DL
MCV RBC AUTO: 94.6 FL
MONOCYTES # BLD AUTO: 0.58 K/UL
MONOCYTES NFR BLD AUTO: 8.2 %
NEUTROPHILS # BLD AUTO: 3.39 K/UL
NEUTROPHILS NFR BLD AUTO: 47.7 %
PLATELET # BLD AUTO: 122 K/UL
POTASSIUM SERPL-SCNC: 4.4 MMOL/L
PROT SERPL-MCNC: 7.1 G/DL
RBC # BLD: 4.82 M/UL
RBC # FLD: 12.6 %
SODIUM SERPL-SCNC: 141 MMOL/L
VIT B12 SERPL-MCNC: 258 PG/ML
WBC # FLD AUTO: 7.1 K/UL

## 2022-11-16 LAB
M TB IFN-G BLD-IMP: NEGATIVE
QUANTIFERON TB PLUS MITOGEN MINUS NIL: >10 IU/ML
QUANTIFERON TB PLUS NIL: 0.01 IU/ML
QUANTIFERON TB PLUS TB1 MINUS NIL: 0 IU/ML
QUANTIFERON TB PLUS TB2 MINUS NIL: 0 IU/ML

## 2023-04-03 ENCOUNTER — RX RENEWAL (OUTPATIENT)
Age: 23
End: 2023-04-03

## 2023-06-28 ENCOUNTER — NON-APPOINTMENT (OUTPATIENT)
Age: 23
End: 2023-06-28

## 2023-11-12 ENCOUNTER — RX RENEWAL (OUTPATIENT)
Age: 23
End: 2023-11-12

## 2024-03-17 ENCOUNTER — RX RENEWAL (OUTPATIENT)
Age: 24
End: 2024-03-17

## 2024-05-09 ENCOUNTER — APPOINTMENT (OUTPATIENT)
Dept: GASTROENTEROLOGY | Facility: CLINIC | Age: 24
End: 2024-05-09

## 2024-05-27 ENCOUNTER — NON-APPOINTMENT (OUTPATIENT)
Age: 24
End: 2024-05-27

## 2024-05-28 ENCOUNTER — APPOINTMENT (OUTPATIENT)
Dept: GASTROENTEROLOGY | Facility: CLINIC | Age: 24
End: 2024-05-28
Payer: COMMERCIAL

## 2024-05-28 VITALS
HEART RATE: 66 BPM | WEIGHT: 180 LBS | HEIGHT: 71 IN | BODY MASS INDEX: 25.2 KG/M2 | SYSTOLIC BLOOD PRESSURE: 121 MMHG | OXYGEN SATURATION: 97 % | DIASTOLIC BLOOD PRESSURE: 76 MMHG

## 2024-05-28 DIAGNOSIS — K50.90 CROHN'S DISEASE, UNSPECIFIED, W/OUT COMPLICATIONS: ICD-10-CM

## 2024-05-28 LAB
ALBUMIN SERPL ELPH-MCNC: 4.9 G/DL
ALP BLD-CCNC: 69 U/L
ALT SERPL-CCNC: 21 U/L
ANION GAP SERPL CALC-SCNC: 12 MMOL/L
AST SERPL-CCNC: 20 U/L
BILIRUB SERPL-MCNC: 0.4 MG/DL
BUN SERPL-MCNC: 12 MG/DL
CALCIUM SERPL-MCNC: 9.4 MG/DL
CHLORIDE SERPL-SCNC: 103 MMOL/L
CO2 SERPL-SCNC: 23 MMOL/L
CREAT SERPL-MCNC: 1.04 MG/DL
EGFR: 102 ML/MIN/1.73M2
HCT VFR BLD CALC: 45.4 %
HGB BLD-MCNC: 15.1 G/DL
MCHC RBC-ENTMCNC: 30.7 PG
MCHC RBC-ENTMCNC: 33.3 GM/DL
MCV RBC AUTO: 92.3 FL
PLATELET # BLD AUTO: 175 K/UL
POTASSIUM SERPL-SCNC: 4.6 MMOL/L
PROT SERPL-MCNC: 7.8 G/DL
RBC # BLD: 4.92 M/UL
RBC # FLD: 12.3 %
SODIUM SERPL-SCNC: 139 MMOL/L
WBC # FLD AUTO: 6.8 K/UL

## 2024-05-28 PROCEDURE — G2211 COMPLEX E/M VISIT ADD ON: CPT | Mod: NC,1L

## 2024-05-28 PROCEDURE — 99214 OFFICE O/P EST MOD 30 MIN: CPT

## 2024-05-28 RX ORDER — ADALIMUMAB 40MG/0.4ML
40 KIT SUBCUTANEOUS
Qty: 1 | Refills: 5 | Status: ACTIVE | COMMUNITY
Start: 2018-06-27 | End: 1900-01-01

## 2024-05-28 NOTE — ASSESSMENT
[FreeTextEntry1] : Well-controlled Crohn's No indication for repeat colonoscopy, imaging, capsule endoscopy at this time Generally, discussed with patient that given his history, and recently reconfirmed absence of colonic Crohn's, that it will hopefully be many years before he needs another colonoscopy, perhaps at the generally recommended screening age Obviously, sooner examination should clinical circumstances change Also discussed with patient likely future change from brand-name Humira to a bio similar, as is becoming more common amongst IBD patients He expresses good understanding and agreement Routine blood testing today Office visit 6 months

## 2024-05-28 NOTE — HISTORY OF PRESENT ILLNESS
[FreeTextEntry1] : 23-year-old male Isolated small bowel Crohn's disease Normal colonoscopy 2022 Continues to do well on Humira maintenance every other week Due for blood testing  Social history: Contractor, [place]

## 2024-05-30 LAB
HBV SURFACE AG SER QL: NONREACTIVE
M TB IFN-G BLD-IMP: NEGATIVE
QUANTIFERON TB PLUS MITOGEN MINUS NIL: >10 IU/ML
QUANTIFERON TB PLUS NIL: 0.02 IU/ML
QUANTIFERON TB PLUS TB1 MINUS NIL: 0 IU/ML
QUANTIFERON TB PLUS TB2 MINUS NIL: 0 IU/ML

## 2024-07-09 ENCOUNTER — APPOINTMENT (OUTPATIENT)
Dept: GASTROENTEROLOGY | Facility: CLINIC | Age: 24
End: 2024-07-09

## 2024-12-25 PROBLEM — F10.90 ALCOHOL USE: Status: ACTIVE | Noted: 2017-07-23

## 2025-01-08 RX ORDER — ADALIMUMAB 40MG/0.4ML
40 KIT SUBCUTANEOUS
Qty: 1 | Refills: 5 | Status: ACTIVE | COMMUNITY
Start: 2025-01-08 | End: 1900-01-01

## 2025-04-15 ENCOUNTER — NON-APPOINTMENT (OUTPATIENT)
Age: 25
End: 2025-04-15

## 2025-04-15 ENCOUNTER — APPOINTMENT (OUTPATIENT)
Dept: GASTROENTEROLOGY | Facility: CLINIC | Age: 25
End: 2025-04-15
Payer: COMMERCIAL

## 2025-04-15 VITALS
WEIGHT: 185 LBS | BODY MASS INDEX: 25.9 KG/M2 | SYSTOLIC BLOOD PRESSURE: 120 MMHG | HEIGHT: 71 IN | HEART RATE: 56 BPM | DIASTOLIC BLOOD PRESSURE: 80 MMHG | OXYGEN SATURATION: 99 %

## 2025-04-15 DIAGNOSIS — L40.9 PSORIASIS, UNSPECIFIED: ICD-10-CM

## 2025-04-15 DIAGNOSIS — K50.90 CROHN'S DISEASE, UNSPECIFIED, W/OUT COMPLICATIONS: ICD-10-CM

## 2025-04-15 LAB
ALBUMIN SERPL ELPH-MCNC: 5.1 G/DL
ALP BLD-CCNC: 100 U/L
ALT SERPL-CCNC: 16 U/L
ANION GAP SERPL CALC-SCNC: 14 MMOL/L
AST SERPL-CCNC: 22 U/L
BILIRUB SERPL-MCNC: 0.5 MG/DL
BUN SERPL-MCNC: 13 MG/DL
CALCIUM SERPL-MCNC: 10.5 MG/DL
CHLORIDE SERPL-SCNC: 103 MMOL/L
CO2 SERPL-SCNC: 26 MMOL/L
CREAT SERPL-MCNC: 0.81 MG/DL
EGFRCR SERPLBLD CKD-EPI 2021: 125 ML/MIN/1.73M2
POTASSIUM SERPL-SCNC: 4.7 MMOL/L
PROT SERPL-MCNC: 8.1 G/DL
SODIUM SERPL-SCNC: 142 MMOL/L

## 2025-04-15 PROCEDURE — 99213 OFFICE O/P EST LOW 20 MIN: CPT

## 2025-04-15 PROCEDURE — G2211 COMPLEX E/M VISIT ADD ON: CPT

## 2025-04-16 LAB
25(OH)D3 SERPL-MCNC: 25.3 NG/ML
HBV CORE IGG+IGM SER QL: NONREACTIVE
HBV SURFACE AB SER QL: REACTIVE
HBV SURFACE AG SER QL: NONREACTIVE
HCT VFR BLD CALC: 50.3 %
HGB BLD-MCNC: 16.3 G/DL
MCHC RBC-ENTMCNC: 30.1 PG
MCHC RBC-ENTMCNC: 32.4 G/DL
MCV RBC AUTO: 93 FL
PLATELET # BLD AUTO: 167 K/UL
RBC # BLD: 5.41 M/UL
RBC # FLD: 12.1 %
VIT B12 SERPL-MCNC: 396 PG/ML
WBC # FLD AUTO: 6.42 K/UL

## 2025-04-17 LAB
M TB IFN-G BLD-IMP: NEGATIVE
QUANTIFERON TB PLUS MITOGEN MINUS NIL: >10 IU/ML
QUANTIFERON TB PLUS NIL: 0.03 IU/ML
QUANTIFERON TB PLUS TB1 MINUS NIL: 0 IU/ML
QUANTIFERON TB PLUS TB2 MINUS NIL: 0 IU/ML